# Patient Record
Sex: MALE | Race: WHITE | NOT HISPANIC OR LATINO
[De-identification: names, ages, dates, MRNs, and addresses within clinical notes are randomized per-mention and may not be internally consistent; named-entity substitution may affect disease eponyms.]

---

## 2022-10-08 ENCOUNTER — APPOINTMENT (OUTPATIENT)
Dept: ORTHOPEDIC SURGERY | Facility: CLINIC | Age: 52
End: 2022-10-08

## 2022-10-08 VITALS
WEIGHT: 162 LBS | BODY MASS INDEX: 27.66 KG/M2 | HEIGHT: 64 IN | DIASTOLIC BLOOD PRESSURE: 92 MMHG | SYSTOLIC BLOOD PRESSURE: 130 MMHG | TEMPERATURE: 98.1 F | HEART RATE: 65 BPM

## 2022-10-08 DIAGNOSIS — M77.11 LATERAL EPICONDYLITIS, RIGHT ELBOW: ICD-10-CM

## 2022-10-08 DIAGNOSIS — M25.521 PAIN IN RIGHT ELBOW: ICD-10-CM

## 2022-10-08 PROCEDURE — 73080 X-RAY EXAM OF ELBOW: CPT | Mod: RT

## 2022-10-08 PROCEDURE — 20551 NJX 1 TENDON ORIGIN/INSJ: CPT | Mod: RT

## 2022-10-08 PROCEDURE — 99204 OFFICE O/P NEW MOD 45 MIN: CPT | Mod: NC,25

## 2022-10-08 RX ORDER — DICLOFENAC SODIUM 75 MG/1
75 TABLET, DELAYED RELEASE ORAL
Qty: 60 | Refills: 1 | Status: ACTIVE | COMMUNITY
Start: 2022-10-08 | End: 1900-01-01

## 2022-10-08 NOTE — PHYSICAL EXAM
[Normal Finger/nose] : finger to nose coordination [Normal] : no peripheral adenopathy appreciated [de-identified] : Right Elbow Exam\par \par Skin: Intact with no erythema, no ecchymosis, no palpable effusion.\par ROM: 0 to 140 degrees of flexion. There is 80 degrees of pronation and 80 degrees of supination.\par Stability: Test for instability negative including posterolateral rotatory instability and valgus instability.\par Tenderness: No TTP along the posterior lateral or posterior medial joint line.  No TTP of biceps and triceps. No TTP over medial epicondyle. Exquisite TTP over the lateral epicondyle. Negative Tinel's over the ulnar n.\par Strength: 5/5 strength with significant pain with forced wrist and 3rd digit extension. No evidence of intrinsic or extrinsic atrophy.\par Sensation: Grossly intact without deficits.\par \par  [de-identified] : FERNYR [de-identified] : 3 xray views of the right shoulder and 3 xray views of the right elbow were obtained.\par \par -No fractures or dislocations

## 2022-10-08 NOTE — PROCEDURE
[FreeTextEntry1] : Injection: Elbow - Right\par \par There was a discussion with the patient regarding this procedure and all questions/concerns were answered.  All of the risks, benefits and alternatives were discussed at length.  These include but are not limited to bleeding, infection, and allergic reaction. Alcohol was used to clean, sterilize and prep the skin at the injection site over the lateral epicondyle of the elbow. Ethyl chloride spray was used as a topical anesthetic. A 22G needle was used to inject 0.5cc of 1% lidocaine and 1cc of 40mg/mL methylprednisolone into the elbow. A sterile bandage was applied to the site of the injection. The patient tolerated the procedure well and there were no complications.\par \par

## 2022-10-08 NOTE — HISTORY OF PRESENT ILLNESS
[FreeTextEntry1] : 10/08/2022: Patient is a 51 year-old, right-hand-dominant male who presents to the office today for initial evaluation of right elbow pain. The pain has been present for the past month. The pain begins over the lateral aspect of the elbow and radiates up into the mid-humerus area. He notes that activities such as lifting a coffee cup is extremely painful. He describes the pain as a deep, strong ache. Pain is worse with activities and better with rest. He has been taking Advil which has been helpful. He has not used any ice. He did get some temporary relief from Timmy Garza with Lidocaine. He does endorse rare episodes of paresthesias in his fingers. \par \par The patient's past medical history, past surgical history, medications and allergies were reviewed by me today and documented accordingly. In addition, the patient's family and social history, which were non-contributory to this visit, were also reviewed. Intake form was reviewed.\par \par

## 2022-10-08 NOTE — ASSESSMENT
[FreeTextEntry1] : Patient presents with right elbow pain. Their symptoms are consistent with lateral epicondylitis of the right elbow. The nature of this condition was described at length with the patient and they verbalized understanding. \par \par Recommendations: \par -PT (Start in 2 weeks)\par -Diclofenac\par -Wrist immobilizer to rest the forearm/elbow musculature/tendons\par -Followup with Dr. Caass as needed\par -Patient was given ample time to ask questions and all questions were answered to the patient's full satisfaction.\par \par The patient was in full agreement with this plan and appreciative of their care.\par

## 2022-10-16 PROBLEM — M25.521 RIGHT ELBOW PAIN: Status: ACTIVE | Noted: 2022-10-07

## 2024-02-21 ENCOUNTER — APPOINTMENT (OUTPATIENT)
Dept: CT IMAGING | Facility: CLINIC | Age: 54
End: 2024-02-21
Payer: COMMERCIAL

## 2024-02-21 ENCOUNTER — OUTPATIENT (OUTPATIENT)
Dept: OUTPATIENT SERVICES | Facility: HOSPITAL | Age: 54
LOS: 1 days | End: 2024-02-21
Payer: COMMERCIAL

## 2024-02-21 DIAGNOSIS — R06.00 DYSPNEA, UNSPECIFIED: ICD-10-CM

## 2024-02-21 DIAGNOSIS — R07.9 CHEST PAIN, UNSPECIFIED: ICD-10-CM

## 2024-02-21 PROCEDURE — 75574 CT ANGIO HRT W/3D IMAGE: CPT

## 2024-02-21 PROCEDURE — 75574 CT ANGIO HRT W/3D IMAGE: CPT | Mod: 26
